# Patient Record
Sex: MALE | Race: WHITE | NOT HISPANIC OR LATINO | Employment: FULL TIME | URBAN - METROPOLITAN AREA
[De-identification: names, ages, dates, MRNs, and addresses within clinical notes are randomized per-mention and may not be internally consistent; named-entity substitution may affect disease eponyms.]

---

## 2017-12-28 ENCOUNTER — OFFICE VISIT - HEALTHEAST (OUTPATIENT)
Dept: INTERNAL MEDICINE | Facility: CLINIC | Age: 32
End: 2017-12-28

## 2017-12-28 DIAGNOSIS — Z80.0 FAMILY HISTORY OF COLON CANCER: ICD-10-CM

## 2017-12-28 DIAGNOSIS — Z00.00 HEALTH CARE MAINTENANCE: ICD-10-CM

## 2017-12-28 DIAGNOSIS — R03.0 ELEVATED BLOOD PRESSURE READING: ICD-10-CM

## 2017-12-28 ASSESSMENT — MIFFLIN-ST. JEOR: SCORE: 2007.66

## 2018-01-17 ENCOUNTER — AMBULATORY - HEALTHEAST (OUTPATIENT)
Dept: LAB | Facility: CLINIC | Age: 33
End: 2018-01-17

## 2018-01-17 ENCOUNTER — COMMUNICATION - HEALTHEAST (OUTPATIENT)
Dept: INTERNAL MEDICINE | Facility: CLINIC | Age: 33
End: 2018-01-17

## 2018-01-17 DIAGNOSIS — Z00.00 HEALTH CARE MAINTENANCE: ICD-10-CM

## 2018-01-17 LAB
ALBUMIN SERPL-MCNC: 3.8 G/DL (ref 3.5–5)
ALP SERPL-CCNC: 23 U/L (ref 45–120)
ALT SERPL W P-5'-P-CCNC: 20 U/L (ref 0–45)
ANION GAP SERPL CALCULATED.3IONS-SCNC: 8 MMOL/L (ref 5–18)
AST SERPL W P-5'-P-CCNC: 20 U/L (ref 0–40)
BILIRUB SERPL-MCNC: 0.7 MG/DL (ref 0–1)
BUN SERPL-MCNC: 17 MG/DL (ref 8–22)
CALCIUM SERPL-MCNC: 9.6 MG/DL (ref 8.5–10.5)
CHLORIDE BLD-SCNC: 100 MMOL/L (ref 98–107)
CHOLEST SERPL-MCNC: 215 MG/DL
CO2 SERPL-SCNC: 28 MMOL/L (ref 22–31)
CREAT SERPL-MCNC: 1.15 MG/DL (ref 0.7–1.3)
ERYTHROCYTE [DISTWIDTH] IN BLOOD BY AUTOMATED COUNT: 12.2 % (ref 11–14.5)
FASTING STATUS PATIENT QL REPORTED: YES
GFR SERPL CREATININE-BSD FRML MDRD: >60 ML/MIN/1.73M2
GLUCOSE BLD-MCNC: 97 MG/DL (ref 70–125)
HCT VFR BLD AUTO: 46.4 % (ref 40–54)
HDLC SERPL-MCNC: 56 MG/DL
HGB BLD-MCNC: 15.3 G/DL (ref 14–18)
LDLC SERPL CALC-MCNC: 138 MG/DL
MCH RBC QN AUTO: 30.5 PG (ref 27–34)
MCHC RBC AUTO-ENTMCNC: 32.9 G/DL (ref 32–36)
MCV RBC AUTO: 93 FL (ref 80–100)
PLATELET # BLD AUTO: 218 THOU/UL (ref 140–440)
PMV BLD AUTO: 7 FL (ref 7–10)
POTASSIUM BLD-SCNC: 4.4 MMOL/L (ref 3.5–5)
PROT SERPL-MCNC: 7.3 G/DL (ref 6–8)
RBC # BLD AUTO: 5 MILL/UL (ref 4.4–6.2)
SODIUM SERPL-SCNC: 136 MMOL/L (ref 136–145)
TRIGL SERPL-MCNC: 103 MG/DL
WBC: 5.2 THOU/UL (ref 4–11)

## 2018-03-19 ENCOUNTER — RECORDS - HEALTHEAST (OUTPATIENT)
Dept: ADMINISTRATIVE | Facility: OTHER | Age: 33
End: 2018-03-19

## 2019-01-05 ENCOUNTER — AMBULATORY - HEALTHEAST (OUTPATIENT)
Dept: NURSING | Facility: CLINIC | Age: 34
End: 2019-01-05

## 2019-07-03 ENCOUNTER — OFFICE VISIT - HEALTHEAST (OUTPATIENT)
Dept: FAMILY MEDICINE | Facility: CLINIC | Age: 34
End: 2019-07-03

## 2019-07-03 DIAGNOSIS — R03.0 ELEVATED BLOOD PRESSURE READING WITHOUT DIAGNOSIS OF HYPERTENSION: ICD-10-CM

## 2019-07-03 DIAGNOSIS — F41.9 ANXIETY: ICD-10-CM

## 2019-07-03 ASSESSMENT — MIFFLIN-ST. JEOR: SCORE: 1993.15

## 2019-07-22 ENCOUNTER — OFFICE VISIT - HEALTHEAST (OUTPATIENT)
Dept: FAMILY MEDICINE | Facility: CLINIC | Age: 34
End: 2019-07-22

## 2019-07-22 DIAGNOSIS — Z00.00 ROUTINE MEDICAL EXAM: ICD-10-CM

## 2019-07-22 ASSESSMENT — MIFFLIN-ST. JEOR: SCORE: 1987.71

## 2019-07-31 ENCOUNTER — COMMUNICATION - HEALTHEAST (OUTPATIENT)
Dept: FAMILY MEDICINE | Facility: CLINIC | Age: 34
End: 2019-07-31

## 2019-07-31 ENCOUNTER — AMBULATORY - HEALTHEAST (OUTPATIENT)
Dept: FAMILY MEDICINE | Facility: CLINIC | Age: 34
End: 2019-07-31

## 2019-07-31 ENCOUNTER — AMBULATORY - HEALTHEAST (OUTPATIENT)
Dept: LAB | Facility: CLINIC | Age: 34
End: 2019-07-31

## 2019-07-31 DIAGNOSIS — Z13.1 SCREENING FOR DIABETES MELLITUS: ICD-10-CM

## 2019-07-31 DIAGNOSIS — Z13.220 SCREENING CHOLESTEROL LEVEL: ICD-10-CM

## 2019-07-31 LAB
CHOLEST SERPL-MCNC: 173 MG/DL
FASTING STATUS PATIENT QL REPORTED: YES
FASTING STATUS PATIENT QL REPORTED: YES
GLUCOSE BLD-MCNC: 98 MG/DL (ref 70–99)
HDLC SERPL-MCNC: 41 MG/DL
LDLC SERPL CALC-MCNC: 117 MG/DL
TRIGL SERPL-MCNC: 74 MG/DL

## 2019-10-02 ENCOUNTER — AMBULATORY - HEALTHEAST (OUTPATIENT)
Dept: NURSING | Facility: CLINIC | Age: 34
End: 2019-10-02

## 2019-12-31 ENCOUNTER — OFFICE VISIT - HEALTHEAST (OUTPATIENT)
Dept: FAMILY MEDICINE | Facility: CLINIC | Age: 34
End: 2019-12-31

## 2019-12-31 DIAGNOSIS — K59.01 SLOW TRANSIT CONSTIPATION: ICD-10-CM

## 2019-12-31 DIAGNOSIS — R10.84 ABDOMINAL PAIN, GENERALIZED: ICD-10-CM

## 2019-12-31 LAB
ALBUMIN SERPL-MCNC: 4.2 G/DL (ref 3.5–5)
ALBUMIN UR-MCNC: ABNORMAL MG/DL
ALP SERPL-CCNC: 24 U/L (ref 45–120)
ALT SERPL W P-5'-P-CCNC: 23 U/L (ref 0–45)
AMORPH CRY #/AREA URNS HPF: ABNORMAL /[HPF]
ANION GAP SERPL CALCULATED.3IONS-SCNC: 8 MMOL/L (ref 5–18)
APPEARANCE UR: CLEAR
AST SERPL W P-5'-P-CCNC: 18 U/L (ref 0–40)
BACTERIA #/AREA URNS HPF: ABNORMAL HPF
BASOPHILS # BLD AUTO: 0 THOU/UL (ref 0–0.2)
BASOPHILS NFR BLD AUTO: 1 % (ref 0–2)
BILIRUB SERPL-MCNC: 0.6 MG/DL (ref 0–1)
BILIRUB UR QL STRIP: ABNORMAL
BUN SERPL-MCNC: 14 MG/DL (ref 8–22)
CALCIUM SERPL-MCNC: 9.9 MG/DL (ref 8.5–10.5)
CHLORIDE BLD-SCNC: 103 MMOL/L (ref 98–107)
CO2 SERPL-SCNC: 28 MMOL/L (ref 22–31)
COLOR UR AUTO: YELLOW
CREAT SERPL-MCNC: 1.23 MG/DL (ref 0.7–1.3)
EOSINOPHIL # BLD AUTO: 0.1 THOU/UL (ref 0–0.4)
EOSINOPHIL NFR BLD AUTO: 3 % (ref 0–6)
ERYTHROCYTE [DISTWIDTH] IN BLOOD BY AUTOMATED COUNT: 12.2 % (ref 11–14.5)
GFR SERPL CREATININE-BSD FRML MDRD: >60 ML/MIN/1.73M2
GLUCOSE BLD-MCNC: 96 MG/DL (ref 70–125)
GLUCOSE UR STRIP-MCNC: NEGATIVE MG/DL
HCT VFR BLD AUTO: 47.8 % (ref 40–54)
HGB BLD-MCNC: 16.3 G/DL (ref 14–18)
HGB UR QL STRIP: NEGATIVE
KETONES UR STRIP-MCNC: ABNORMAL MG/DL
LEUKOCYTE ESTERASE UR QL STRIP: NEGATIVE
LYMPHOCYTES # BLD AUTO: 1.6 THOU/UL (ref 0.8–4.4)
LYMPHOCYTES NFR BLD AUTO: 35 % (ref 20–40)
MCH RBC QN AUTO: 31.4 PG (ref 27–34)
MCHC RBC AUTO-ENTMCNC: 34.2 G/DL (ref 32–36)
MCV RBC AUTO: 92 FL (ref 80–100)
MONOCYTES # BLD AUTO: 0.5 THOU/UL (ref 0–0.9)
MONOCYTES NFR BLD AUTO: 10 % (ref 2–10)
MUCOUS THREADS #/AREA URNS LPF: ABNORMAL LPF
NEUTROPHILS # BLD AUTO: 2.3 THOU/UL (ref 2–7.7)
NEUTROPHILS NFR BLD AUTO: 52 % (ref 50–70)
NITRATE UR QL: NEGATIVE
PH UR STRIP: 5.5 [PH] (ref 5–8)
PLATELET # BLD AUTO: 213 THOU/UL (ref 140–440)
PMV BLD AUTO: 6.9 FL (ref 7–10)
POTASSIUM BLD-SCNC: 4.5 MMOL/L (ref 3.5–5)
PROT SERPL-MCNC: 7.7 G/DL (ref 6–8)
RBC # BLD AUTO: 5.21 MILL/UL (ref 4.4–6.2)
RBC #/AREA URNS AUTO: ABNORMAL HPF
SODIUM SERPL-SCNC: 139 MMOL/L (ref 136–145)
SP GR UR STRIP: >=1.03 (ref 1–1.03)
SQUAMOUS #/AREA URNS AUTO: ABNORMAL LPF
UROBILINOGEN UR STRIP-ACNC: ABNORMAL
WBC #/AREA URNS AUTO: ABNORMAL HPF
WBC: 4.5 THOU/UL (ref 4–11)

## 2019-12-31 ASSESSMENT — MIFFLIN-ST. JEOR: SCORE: 2077.06

## 2020-07-21 ENCOUNTER — RECORDS - HEALTHEAST (OUTPATIENT)
Dept: ADMINISTRATIVE | Facility: OTHER | Age: 35
End: 2020-07-21

## 2020-10-19 ENCOUNTER — OFFICE VISIT - HEALTHEAST (OUTPATIENT)
Dept: FAMILY MEDICINE | Facility: CLINIC | Age: 35
End: 2020-10-19

## 2020-10-19 DIAGNOSIS — R30.0 DYSURIA: ICD-10-CM

## 2020-10-19 DIAGNOSIS — Z00.00 ROUTINE MEDICAL EXAM: ICD-10-CM

## 2020-10-19 LAB
ALBUMIN UR-MCNC: NEGATIVE MG/DL
APPEARANCE UR: CLEAR
BILIRUB UR QL STRIP: NEGATIVE
COLOR UR AUTO: YELLOW
GLUCOSE UR STRIP-MCNC: NEGATIVE MG/DL
HGB UR QL STRIP: NEGATIVE
KETONES UR STRIP-MCNC: NEGATIVE MG/DL
LEUKOCYTE ESTERASE UR QL STRIP: NEGATIVE
NITRATE UR QL: NEGATIVE
PH UR STRIP: 6.5 [PH] (ref 5–8)
SP GR UR STRIP: 1.01 (ref 1–1.03)
UROBILINOGEN UR STRIP-ACNC: NORMAL

## 2020-10-19 ASSESSMENT — ANXIETY QUESTIONNAIRES
3. WORRYING TOO MUCH ABOUT DIFFERENT THINGS: SEVERAL DAYS
7. FEELING AFRAID AS IF SOMETHING AWFUL MIGHT HAPPEN: NOT AT ALL
6. BECOMING EASILY ANNOYED OR IRRITABLE: NOT AT ALL
5. BEING SO RESTLESS THAT IT IS HARD TO SIT STILL: NOT AT ALL
4. TROUBLE RELAXING: NOT AT ALL
2. NOT BEING ABLE TO STOP OR CONTROL WORRYING: NOT AT ALL
1. FEELING NERVOUS, ANXIOUS, OR ON EDGE: SEVERAL DAYS
GAD7 TOTAL SCORE: 2

## 2020-10-19 ASSESSMENT — MIFFLIN-ST. JEOR: SCORE: 2058.13

## 2020-10-19 ASSESSMENT — PATIENT HEALTH QUESTIONNAIRE - PHQ9: SUM OF ALL RESPONSES TO PHQ QUESTIONS 1-9: 2

## 2021-05-27 ASSESSMENT — PATIENT HEALTH QUESTIONNAIRE - PHQ9: SUM OF ALL RESPONSES TO PHQ QUESTIONS 1-9: 2

## 2021-05-28 ASSESSMENT — ANXIETY QUESTIONNAIRES: GAD7 TOTAL SCORE: 2

## 2021-05-30 NOTE — PROGRESS NOTES
MALE PREVENTATIVE EXAM    Assessment and Plan:       1. Routine medical exam    Generally the patient is doing very well.  We discussed healthy lifestyles, including adequate exercise (3-4 times a week for 20-30 minutes), and a healthy diet.  Patient should return for annual physicals, and we can also see them here as needed.           Next follow up:  No follow-ups on file.    Immunization Review  Adult Imm Review: No immunizations due today      I discussed the following with the patient:   Adult Healthy Living: Importance of regular exercise  Healthy nutrition  Getting adequate sleep  Stress management    I have had an Advance Directives discussion with the patient.    Subjective:   Chief Complaint: Sony Phillips is an 33 y.o. male here for a preventative health visit.     HPI: 33-year-old male here today for a full physical and to establish care.  He is doing quite well.  He has no major concerns or problems today.  He is feeling quite well, and tries to eat a good healthy diet and exercise when he is able to.  He would like to get some fasting labs done at that time when it would be convenient and he would be fasting.    Healthy Habits  Are you taking a daily aspirin? No  Do you typically exercising at least 40 min, 3-4 times per week?  Yes  Do you usually eat at least 4 servings of fruit and vegetables a day, include whole grains and fiber and avoid regularly eating high fat foods? Yes  Have you had an eye exam in the past two years? NO  Do you see a dentist twice per year? Yes  Do you have any concerns regarding sleep? No    Safety Screen  If you own firearms, are they secured in a locked gun cabinet or with trigger locks? Yes  Do you feel you are safe where you are living?: Yes (7/22/2019  2:26 PM)  Do you feel you are safe in your relationship(s)?: Yes (7/22/2019  2:26 PM)      Review of Systems:  Please see above.  The rest of the review of systems are negative for all systems.     Cancer Screening   "   Patient has no health maintenance due at this time          Patient Care Team:  Rahul Mckeon MD as PCP - General (Family Medicine)        History     Reviewed By Date/Time Sections Reviewed    Rahul Mckeon MD 7/22/2019  2:33 PM Medical, Surgical, Tobacco, Alcohol, Drug Use, Sexual Activity, Family        Patient is establishing care with me today. Please see past medical history, surgical history, social history and family history, all of which were completed in their entirety today.      Objective:   Vital Signs:   Visit Vitals  /80   Pulse 100   Ht 6' 2\" (1.88 m)   Wt 215 lb 9.6 oz (97.8 kg)   SpO2 100%   BMI 27.68 kg/m           PHYSICAL EXAM    Well developed, well nourished, no acute distress.  HEENT: normocephalic/atraumatic, PERRLA/EOMI, TMs: Gray, normal light reflex, no nasal discharge.  Oral mucosa: no erythema/exudate  Neck: No LAD/masses/thyromegaly/bruits  Lungs: clear bilaterally  Heart: regular rate and rhythm, no murmurs/gallops/rubs.  Abdomen: Normal bowel sounds, soft, non-tender, non-distended, no masses, neg Bunch's/McBurney's, no rebound/guarding  Genital: Bilaterally descended testes, no masses, non-tender, no hernia.  No urethral discharge or erythema.  No lesions, normal phallus.  Lymphatics: no supraclavicular/axillary/epitrochlear/inguinal LAD. No edema.  Neuro: A&O x 3, CN II-XII intact, strength 5/5, reflexes symmetric, sensory intact to light touch.  Psych: Behavior appropriate, engaging.  Thought processes congruent, non-tangential.  Musculoskeletal: no gross deformities.  Skin: no rashes or lesions.            Medication List      as of 7/22/2019 11:59 PM     You have not been prescribed any medications.         Additional Screenings Completed Today:     "

## 2021-05-30 NOTE — PROGRESS NOTES
ASSESSMENT/PLAN:  Anxiety  GAD7=11, PHQ2=0  This is a 33-year-old gentleman who comes today as an ED follow-up for lightheadedness and headaches.  Suspect his symptoms are related to that of anxiety.  Topher 7 score was 11.  We spoke about healthy lifestyle modification including weight finding ramya and using coping mechanisms to help with psychosocial stressors.  We also spoke about psychotherapy and pharmacotherapy.  At this point he will work on some healthy lifestyle modification and internal locus of control.  Motivational interviewing was utilized today    Elevated blood pressure reading without diagnosis of hypertension  Part of his elevation in blood pressure may be related to his anxiety.  See management for anxiety above.  Work on healthy lifestyle modification including that of sodium restriction and daily exercise.  Again motivational interviewing was utilized today.  I recommend that he schedule fasting physical with his primary care provider      CHIEF COMPLAINT:  Chief Complaint   Patient presents with     Hypertension     had an episode of dizziness/lightheadedness Monday went to [facility] ER wants to follow up        HISTORY OF PRESENT ILLNESS:  Sony is a 33 y.o. male presenting to the clinic today for follow up after presenting to [facility]'s ED on 7/1/19. The patient was seen in the ED on 2 days ago after having an episode of lightheadedness, racing heart, and shortness of breath that made it difficult to speak. In the ED he had a normal head and neck CTA. The patient states that the symptoms lasted throughout his duration in the ED, but improved once he was resting at home. He states that he has had episodes of these symptoms in the past, but cannot identify any triggers. He states that he has always been and anxious individual, but this has not interfered with his work or personal life.     REVIEW OF SYSTEMS:   Constitutional: Negative.   HENT: Negative.   Eyes: Negative.   Respiratory: Negative.  "  Cardiovascular: Negative.   Gastrointestinal: Negative.   Endocrine: Negative.   Genitourinary: Negative.   Musculoskeletal: Negative.   Skin: Negative.   Allergic/Immunologic: Negative.   Neurological: Negative.   Hematological: Negative.   Psychiatric/Behavioral: Negative.  All other systems negative.      PFSH:    Expecting first child    TOBACCO USE:  Social History     Tobacco Use   Smoking Status Never Smoker   Smokeless Tobacco Never Used       VITALS:  Vitals:    07/03/19 0859 07/03/19 0933   BP: 122/90 110/74   Pulse: 84    Weight: 216 lb 12.8 oz (98.3 kg)    Height: 6' 2\" (1.88 m)      Wt Readings from Last 3 Encounters:   07/03/19 216 lb 12.8 oz (98.3 kg)   12/28/17 220 lb (99.8 kg)       PHYSICAL EXAM:  Constitutional: Patient is oriented to person, place, and time. Patient appears well-developed and well-nourished. No distress.   Head: Normocephalic and atraumatic.   Right Ear: External ear normal.   Left Ear: External ear normal.   Nose: Nose normal.   Eyes: Conjunctivae and EOM are normal. Right eye exhibits no discharge. Left eye exhibits no discharge. No scleral icterus.   Neurological: Patient is alert and oriented to person, place, and time. No cranial nerve deficit. Coordination normal.   Skin: No rash noted. Patient is not diaphoretic. No erythema. No pallor.    QUALITY MEASURES:  The following high BMI interventions were performed this visit: encouragement to exercise, dietary management education, guidance, and counseling and lifestyle education regarding diet    ADDITIONAL HISTORY SUMMARIZED (2): 7/1/19 ED note reviewed regarding shortness of breath and lightheadedness.   DECISION TO OBTAIN EXTRA INFORMATION (1): None.   RADIOLOGY TESTS (1): Reviewed CT scan report [facility]  LABS (1): Reviewed lab results from 2018.  MEDICINE TESTS (1): None.  INDEPENDENT REVIEW (2 each): None.     Total data points: 4    The visit lasted a total of 25 minutes face to face with the patient. Over " 50% of the time was spent counseling and educating the patient about anxiety.    I, Tierney Souza, am scribing for and in the presence of, Dr. Arreaga.    I, Dr. Arreaga, personally performed the services described in this documentation, as scribed by Tierney Souza in my presence, and it is both accurate and complete.    MEDICATIONS:  No current outpatient medications on file.     No current facility-administered medications for this visit.

## 2021-05-31 VITALS — BODY MASS INDEX: 28.23 KG/M2 | WEIGHT: 220 LBS | HEIGHT: 74 IN

## 2021-06-03 VITALS — BODY MASS INDEX: 27.82 KG/M2 | WEIGHT: 216.8 LBS | HEIGHT: 74 IN

## 2021-06-03 VITALS — BODY MASS INDEX: 27.67 KG/M2 | WEIGHT: 215.6 LBS | HEIGHT: 74 IN

## 2021-06-04 VITALS
HEIGHT: 74 IN | OXYGEN SATURATION: 99 % | WEIGHT: 235.3 LBS | BODY MASS INDEX: 30.2 KG/M2 | HEART RATE: 112 BPM | SYSTOLIC BLOOD PRESSURE: 128 MMHG | DIASTOLIC BLOOD PRESSURE: 92 MMHG

## 2021-06-04 NOTE — PROGRESS NOTES
ASSESSMENT:  1. Slow transit constipation    We did discuss constipation as a general diagnosis today.  No doubt that this is been brewing for some time, and are not 100% sure that all of his symptoms are attributable to this but certainly some of it would be.  We discussed a 2 phase approach using something like milk of magnesia or a colonoscopy prep to clean himself out a bit first, and then daily MiraLAX but I would probably have him plan on taking for about 3 months.  We discussed the importance of good diet and using fiber and plenty of hydration along with the MiraLAX to try to keep things moving along nicely.  I think this will take away some of the cramping that he has been having and discomfort in the left upper quadrant area and then down toward the rectal area.    2. Abdominal pain, generalized    - Urinalysis-UC if Indicated  - HM1(CBC and Differential)  - Comprehensive Metabolic Panel  - HM1 (CBC with Diff)  - XR Abdomen 2 Views          PLAN:  There are no Patient Instructions on file for this visit.    Orders Placed This Encounter   Procedures     XR Abdomen 2 Views     Order Specific Question:   Reason for Exam (Describe Symptoms):     Answer:   Abdominal Pain     Order Specific Question:   Can the procedure be changed per Radiologist protocol?     Answer:   Yes     Urinalysis-UC if Indicated     Comprehensive Metabolic Panel     HM1 (CBC with Diff)     There are no discontinued medications.    No follow-ups on file.    CHIEF COMPLAINT:  Chief Complaint   Patient presents with     GI Problem     Severe stomach pains - 4 instances over past few months -no diarrhea, vomiting or nausea. Had chest pain with the most recent episode that woke him from sleep - happened 12/21/19.       HISTORY OF PRESENT ILLNESS:  Sony is a 34 y.o. male presenting to the clinic today with abdominal pain.  He is noticed several episodes of cramping fairly severe abdominal pain over the last month or so.  He states that one  "time it was after a large meal that he would get a lot of cramping in the epigastric to left upper quadrant area that seemed to resolve over about a half an hour.  It was pretty uncomfortable and severe for him.  He felt like it was a wave of pain that came on and that eventually resolved.  He also described one time in the middle of the night waking up with cramping in the epigastric area and even in the substernal area so that he thought he was actually having chest pain like a heart attack.  It resolved over about 10 or 15 minutes but he was pretty worried about it at that time.  He admits to not having the best diet.  He also admits to not drinking enough fluids during the day.  He has not had troubles with constipation in the past.  He denies any flank pain or radiating pain around to the groin.  Denies any hematuria urgency or frequency of urination.  He does not really complain of constipation directly but also has not had a chance to really investigate his stools very closely.  He has had no fevers or chills or night sweats or weight loss.  No vomiting noted.  No right upper quadrant pain noted.    REVIEW OF SYSTEMS:     All other systems are negative.    PFSH:    Reviewed      TOBACCO USE:  Social History     Tobacco Use   Smoking Status Never Smoker   Smokeless Tobacco Never Used       VITALS:  Vitals:    12/31/19 0858 12/31/19 0958   BP: (!) 134/96 (!) 128/92   Patient Site: Left Arm Left Arm   Patient Position: Sitting Sitting   Cuff Size: Adult Large Adult Large   Pulse: (!) 112    SpO2: 99%    Weight: (!) 235 lb 4.8 oz (106.7 kg)    Height: 6' 2\" (1.88 m)      Wt Readings from Last 3 Encounters:   12/31/19 (!) 235 lb 4.8 oz (106.7 kg)   07/22/19 215 lb 9.6 oz (97.8 kg)   07/03/19 216 lb 12.8 oz (98.3 kg)     Body mass index is 30.21 kg/m .    PHYSICAL EXAM:  Constitutional:  Well appearing patient in no acute distress.  Vitals:  Per nursing notes.    HEENT:  Normocephalic, atraumatic.  Ears are clear " bilaterally, with no fluid or redness, and landmarks visible.  Pupils are equal and reactive to light, extraocular muscles intact, visual fields are full.  Nose is normal, and oropharynx is clear without redness.    Neck is without lymphadenopathy.    Lungs:  Clear to auscultation bilaterally without wheezes, rales or rhonchi.   Cardiac:  Regular rate and rhythm without murmurs, rubs, or gallops.     Abdomen soft nontender nondistended bowel sounds present in all quadrants.    Abdominal x-ray shows a significant amount of stool in the right side as well as the left side and down to the rectal area.    Recent Results (from the past 24 hour(s))   Urinalysis-UC if Indicated   Result Value Ref Range    Color, UA Yellow Colorless, Yellow, Straw, Light Yellow    Clarity, UA Clear Clear    Glucose, UA Negative Negative    Bilirubin, UA Small (!) Negative    Ketones, UA Trace (!) Negative    Specific Gravity, UA >=1.030 1.005 - 1.030    Blood, UA Negative Negative    pH, UA 5.5 5.0 - 8.0    Protein, UA Trace (!) Negative mg/dL    Urobilinogen, UA 0.2 E.U./dL 0.2 E.U./dL, 1.0 E.U./dL    Nitrite, UA Negative Negative    Leukocytes, UA Negative Negative    Bacteria, UA None Seen None Seen hpf    RBC, UA None Seen None Seen, 0-2 hpf    WBC, UA None Seen None Seen, 0-5 hpf    Squam Epithel, UA 0-5 None Seen, 0-5 lpf    Amorphous, UA Few (!) None Seen    Mucus, UA Few (!) None Seen lpf   HM1 (CBC with Diff)   Result Value Ref Range    WBC 4.5 4.0 - 11.0 thou/uL    RBC 5.21 4.40 - 6.20 mill/uL    Hemoglobin 16.3 14.0 - 18.0 g/dL    Hematocrit 47.8 40.0 - 54.0 %    MCV 92 80 - 100 fL    MCH 31.4 27.0 - 34.0 pg    MCHC 34.2 32.0 - 36.0 g/dL    RDW 12.2 11.0 - 14.5 %    Platelets 213 140 - 440 thou/uL    MPV 6.9 (L) 7.0 - 10.0 fL    Neutrophils % 52 50 - 70 %    Lymphocytes % 35 20 - 40 %    Monocytes % 10 2 - 10 %    Eosinophils % 3 0 - 6 %    Basophils % 1 0 - 2 %    Neutrophils Absolute 2.3 2.0 - 7.7 thou/uL    Lymphocytes  Absolute 1.6 0.8 - 4.4 thou/uL    Monocytes Absolute 0.5 0.0 - 0.9 thou/uL    Eosinophils Absolute 0.1 0.0 - 0.4 thou/uL    Basophils Absolute 0.0 0.0 - 0.2 thou/uL      MEDICATIONS:  No current outpatient medications on file.     No current facility-administered medications for this visit.

## 2021-06-05 VITALS
WEIGHT: 232 LBS | OXYGEN SATURATION: 99 % | DIASTOLIC BLOOD PRESSURE: 102 MMHG | SYSTOLIC BLOOD PRESSURE: 148 MMHG | HEIGHT: 74 IN | HEART RATE: 72 BPM | BODY MASS INDEX: 29.77 KG/M2

## 2021-06-12 NOTE — PROGRESS NOTES
MALE PREVENTATIVE EXAM    Assessment and Plan:     Patient has been advised of split billing requirements and indicates understanding: Yes    1. Routine medical exam    Generally the patient is doing very well.  We discussed healthy lifestyles, including adequate exercise (3-4 times a week for 20-30 minutes), and a healthy diet.  Patient should return for annual physicals, and we can also see them here as needed.       2. Dysuria    His urinalysis is unremarkable.  It seems that he is very anxious.  He had a lot of anxiety around his constipation last year now it seems to be concerned that there is something going on here.  He is requesting a referral to urology which we certainly can do for him.  I will put that in and we will give him a call to set that up and follow-up with him afterwards as needed.    - Urinalysis-UC if Indicated  - Ambulatory referral to Urology        Next follow up:  No follow-ups on file.    Immunization Review  Adult Imm Review: Due today, orders placed      I discussed the following with the patient:   Adult Healthy Living: Importance of regular exercise  Healthy nutrition  Getting adequate sleep  Stress management  Use of seat belts        Subjective:   Chief Complaint: Sony Phillips is an 35 y.o. male here for a preventative health visit.    Patient has been advised of split billing requirements and indicates understanding: Yes  HPI: Patient here today for a full physical.  He is generally doing quite well without any major concerns or problems.  He does have one concern about urination.  He states that he has discomfort in the bladder area and sometimes with urination.  He is concerned that there might be something going on there.  He has no hematuria.  He is having some urgency.  He is not really getting up at night very much to go.  It does not seem to be getting any better and has been going on for about a month.    Healthy Habits  Are you taking a daily aspirin? No  Do you  "typically exercising at least 40 min, 3-4 times per week?  Yes  Do you usually eat at least 4 servings of fruit and vegetables a day, include whole grains and fiber and avoid regularly eating high fat foods? Yes  Have you had an eye exam in the past two years? NO  Do you see a dentist twice per year? Yes  Do you have any concerns regarding sleep? No      Review of Systems:  Please see above.  The rest of the review of systems are negative for all systems.     Cancer Screening     Patient has no health maintenance due at this time          Patient Care Team:  Rahul Mckeon MD as PCP - General (Family Medicine)  Rahul Mckeon MD as Assigned PCP        History     Reviewed By Date/Time Sections Reviewed    Rahul Mckeon MD 10/19/2020  4:24 PM Medical, Surgical, Tobacco, Alcohol, Drug Use, Sexual Activity, Jackie Galdamez [facility] 10/19/2020  4:05 PM Tobacco            Objective:   Vital Signs:   Visit Vitals  BP (!) 148/102 (Patient Site: Left Arm, Patient Position: Sitting, Cuff Size: Adult Large)   Pulse 72   Ht 6' 1.75\" (1.873 m)   Wt (!) 232 lb (105.2 kg)   SpO2 99%   BMI 29.99 kg/m           PHYSICAL EXAM    Well developed, well nourished, no acute distress.  HEENT: normocephalic/atraumatic, PERRLA/EOMI, TMs: Gray, normal light reflex, no nasal discharge.  Oral mucosa: no erythema/exudate  Neck: No LAD/masses/thyromegaly/bruits  Lungs: clear bilaterally  Heart: regular rate and rhythm, no murmurs/gallops/rubs.  Abdomen: Normal bowel sounds, soft, non-tender, non-distended, no masses, neg Bunch's/McBurney's, no rebound/guarding  Genital: Bilaterally descended testes, no masses, non-tender, no hernia.  No urethral discharge or erythema.  No lesions, normal phallus.  Lymphatics: no supraclavicular/axillary/epitrochlear/inguinal LAD. No edema.  Neuro: A&O x 3, CN II-XII intact, strength 5/5, reflexes symmetric, sensory intact to light touch.  Psych: Behavior appropriate, engaging.  Thought processes " congruent, non-tangential.  Musculoskeletal: no gross deformities.  Skin: no rashes or lesions.            Medication List      as of October 19, 2020 11:59 PM     You have not been prescribed any medications.         Additional Screenings Completed Today:

## 2021-06-15 NOTE — PROGRESS NOTES
[facility] Follow Up Note    Sony Phillips   32 y.o. male    Date of Visit: 2017    Chief Complaint   Patient presents with     Annual Exam     NOT fasting, colonoscopy referral     Subjective  Snoy is a new patient here for health maintenance physical exam.    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   no kids.  Moved from [place], [place] about a year ago.    He is fairly active and works out, shoveled snow today with good exertional ability.    He has had a past history of borderline elevated blood pressure but has not been on blood pressure medication.  He states his blood pressure often is around 140 over 80s.  He denies family history of elevated blood pressure.  He does appear mildly anxious today.  He has not checked his blood pressure on his own.    He does not use cold medicine, NSAIDs or other medicines.  No history of edema.  No history of kidney issues or illnesses as a child.    He does have a history of tonsillar hypertrophy but has not had tonsillitis for over 8 years, not planning tonsillectomy.  He does snore, but no clear apnea spells and he denies any daytime sleepiness.    No history of palpitations or arrhythmia or SVT or rapid heart rate spells.  No history of syncope.  No chest pain.    He had an uncle at age 33  of colon cancer.  His father has had multiple polyps removed, and has had colonoscopy since his late 30s or early 40s.  Patient had a colonoscopy in  that was negative.  His bowels are normal and no blood in stool or melena.  Patient was told he should have a 5 year follow-up colonoscopy which is due this year.    No family history of other cancers.    No family history of early heart disease except for a grandfather in his 70s.    Patient has never smoked.  No significant alcohol.    No family history of diabetes.    No vision issues.  No headache problems.  No mouth sores or swallowing difficulty.  No  "abdominal pain or heartburn or history of GI bleeding.  Urination is normal and denies any erectile dysfunction.  No rash or history of skin cancer.  No arthritis complaints or previous muscle skeletal injuries.    He does do deer hunting in the fall, he does not know when his last tetanus shot was, he thinks it may have been around 10 years ago.    Diet is fairly good, but does eat some fast food and thinks he could eat a lower salt diet.  Eats regular vegetables.    He denies motorcycle riding or other high risk behavior.    PMHx:  No past medical history on file.  PSHx:  No past surgical history on file.  Immunizations:   There is no immunization history on file for this patient.    ROS A comprehensive review of systems was performed and was otherwise negative    Medications, allergies, and problem list were reviewed and updated    Exam  /82  Pulse (!) 120  Ht 6' 2\" (1.88 m)  Wt 220 lb (99.8 kg)  SpO2 99%  BMI 28.25 kg/m2  Well-developed tall otherwise healthy-appearing male with good muscle tone.  Alert and oriented ×3.  Appears just mildly anxious.  Pupils and irises equal and reactive.  Extraocular muscles intact.  No jaundice or conjunctivitis.  External ears and nose exam is normal and tympanic membranes are normal.  Pharynx is normal, does not look particularly crowded except for significant bilateral tonsillar hypertrophy.  Minimal neck adiposity.  Teeth in good condition.  No other oral lesions.  No active pharyngitis currently.  No cervical or supraclavicular adenopathy.  No JVD.  No thyromegaly or nodularity.  Lungs are clear to auscultation with normal respiratory excursion and spine is normal and chest cavity appears normal.  Heart is regular with no murmur rub or gallop.  +2 pedal pulses bilaterally and no ankle edema.  Feet in good condition.  Abdomen is nonobese nontender no hepatosplenomegaly no pulsatile mass.  Skin exam is normal.  Gait is normal.  Testicles normal bilaterally, no " inguinal hernia.    Blood pressure rechecked by me was 145/85.  Heart rate was in the 80s on my recheck and regular.    Assessment/Plan  1. Health care maintenance  His main issues appear to be family history of early colon cancer as well as an elevated blood pressure today.    He declined a flu shot.    He will check on the tetanus shot.    Additional plan below.  - Comprehensive Metabolic Panel; Future  - HM2(CBC w/o Differential); Future  - Lipid Cascade; Future    2. Family history of colon cancer  Refer for 5 year colonoscopy.  I discussed risk of bowel perforation and other risks with colonoscopy.  He accepts these risks and wishes to proceed.  - Ambulatory referral for Colonoscopy    3. Elevated blood pressure reading  He states he has had elevated blood pressures noticed over the years in the past.  I suspect he may have a borderline elevated blood pressure syndrome.  With the new Sprint trial, goal blood pressure less than 120/80.  I would consider amlodipine for patient if blood pressure remains consistently above 130/80.    He will get his own blood pressure cuff and follow-up within the next 1-2 months to evaluate blood pressure.    Return in about 6 weeks (around 2/8/2018) for Recheck.   Patient Instructions   Schedule a fasting lab appointment within the next couple of weeks.  Results will be mailed to you.    Get a blood pressure cuff and begin checking your blood pressure, at least 10 times before next appointment.    Try to maintain a low-salt diet, avoid fast food, and walk regularly and get regular exercise.    See me in approximately 6-8 weeks for discussion on blood pressure.    Check to see when her last tetanus shot was, determine insurance coverage for tetanus shot in the clinic versus local pharmacy.    [place] [facility] will contact you to set up your colonoscopy.    Avelino Queen MD      No current outpatient prescriptions on file.     No current facility-administered medications  for this visit.      No Known Allergies  Social History   Substance Use Topics     Smoking status: Never Smoker     Smokeless tobacco: Never Used     Alcohol use None

## 2021-06-19 NOTE — LETTER
Letter by Kendall Hernnadez MD at      Author: Kendall Hernandez MD Service: -- Author Type: --    Filed:  Encounter Date: 7/31/2019 Status: (Other)         Sony Phillips  [address] [facility] [address]             July 31, 2019         Dear Mr. Phillips,    Below are the results from your recent visit:    Resulted Orders   Lipid Cascade   Result Value Ref Range    Cholesterol 173 <=199 mg/dL    Triglycerides 74 <=149 mg/dL    HDL Cholesterol 41 >=40 mg/dL    LDL Calculated 117 <=129 mg/dL    Patient Fasting > 8hrs? Yes    Glucose   Result Value Ref Range    Glucose 98 70 - 99 mg/dL    Patient Fasting > 8hrs? Yes     Narrative    Fasting Glucose reference range is 70-99 mg/dL per  American Diabetes Association (ADA) guidelines.       Please call with questions or contact us using [employer].    Sincerely,        Electronically signed by Kendall Nevarez MD

## 2021-06-26 ENCOUNTER — HEALTH MAINTENANCE LETTER (OUTPATIENT)
Age: 36
End: 2021-06-26

## 2021-09-28 ENCOUNTER — IMMUNIZATION (OUTPATIENT)
Dept: FAMILY MEDICINE | Facility: CLINIC | Age: 36
End: 2021-09-28
Payer: COMMERCIAL

## 2021-09-28 PROCEDURE — 90471 IMMUNIZATION ADMIN: CPT

## 2021-09-28 PROCEDURE — 90686 IIV4 VACC NO PRSV 0.5 ML IM: CPT

## 2021-12-11 ENCOUNTER — HEALTH MAINTENANCE LETTER (OUTPATIENT)
Age: 36
End: 2021-12-11

## 2021-12-20 ENCOUNTER — TRANSFERRED RECORDS (OUTPATIENT)
Dept: HEALTH INFORMATION MANAGEMENT | Facility: CLINIC | Age: 36
End: 2021-12-20
Payer: COMMERCIAL

## 2022-09-25 ENCOUNTER — HEALTH MAINTENANCE LETTER (OUTPATIENT)
Age: 37
End: 2022-09-25

## 2022-11-02 ASSESSMENT — ENCOUNTER SYMPTOMS
NERVOUS/ANXIOUS: 0
NAUSEA: 0
DIZZINESS: 0
HEMATURIA: 0
CHILLS: 0
EYE PAIN: 0
HEARTBURN: 0
ABDOMINAL PAIN: 0
SHORTNESS OF BREATH: 0
COUGH: 0
DYSURIA: 0
HEADACHES: 0
CONSTIPATION: 1
MYALGIAS: 0
FREQUENCY: 0
PALPITATIONS: 0
DIARRHEA: 0
HEMATOCHEZIA: 0
ARTHRALGIAS: 0
SORE THROAT: 0
FEVER: 0
JOINT SWELLING: 0
PARESTHESIAS: 0
WEAKNESS: 0

## 2022-11-03 ENCOUNTER — OFFICE VISIT (OUTPATIENT)
Dept: FAMILY MEDICINE | Facility: CLINIC | Age: 37
End: 2022-11-03
Payer: COMMERCIAL

## 2022-11-03 VITALS
SYSTOLIC BLOOD PRESSURE: 126 MMHG | OXYGEN SATURATION: 99 % | HEART RATE: 120 BPM | DIASTOLIC BLOOD PRESSURE: 60 MMHG | BODY MASS INDEX: 25.98 KG/M2 | WEIGHT: 196 LBS | HEIGHT: 73 IN | RESPIRATION RATE: 20 BRPM

## 2022-11-03 DIAGNOSIS — Z23 ENCOUNTER FOR IMMUNIZATION: ICD-10-CM

## 2022-11-03 DIAGNOSIS — Z00.00 ROUTINE PHYSICAL EXAMINATION: Primary | ICD-10-CM

## 2022-11-03 DIAGNOSIS — Z11.59 NEED FOR HEPATITIS C SCREENING TEST: ICD-10-CM

## 2022-11-03 DIAGNOSIS — E78.00 HYPERCHOLESTEREMIA: ICD-10-CM

## 2022-11-03 DIAGNOSIS — Z11.4 SCREENING FOR HIV (HUMAN IMMUNODEFICIENCY VIRUS): ICD-10-CM

## 2022-11-03 DIAGNOSIS — L30.9 DERMATITIS: ICD-10-CM

## 2022-11-03 DIAGNOSIS — Z80.0 FAMILY HISTORY OF COLON CANCER: ICD-10-CM

## 2022-11-03 DIAGNOSIS — E66.3 OVERWEIGHT: ICD-10-CM

## 2022-11-03 PROCEDURE — 90471 IMMUNIZATION ADMIN: CPT | Performed by: FAMILY MEDICINE

## 2022-11-03 PROCEDURE — 99395 PREV VISIT EST AGE 18-39: CPT | Mod: 25 | Performed by: FAMILY MEDICINE

## 2022-11-03 PROCEDURE — 90686 IIV4 VACC NO PRSV 0.5 ML IM: CPT | Performed by: FAMILY MEDICINE

## 2022-11-03 ASSESSMENT — PAIN SCALES - GENERAL: PAINLEVEL: NO PAIN (0)

## 2022-11-03 NOTE — PROGRESS NOTES
Assessment/Plan:     Routine physical examination  Routine healthcare maintenance.  Preventative cares reviewed.  Annual physical exams to continue    Screening for HIV (human immunodeficiency virus)  Routine HIV screen based on age criteria.  - HIV Antigen Antibody Combo    Need for hepatitis C screening test  Routine hepatitis C screen based on age criteria.  - Hepatitis C Screen Reflex to HCV RNA Quant and Genotype    Encounter for immunization  Seasonal flu shot provided.  Patient declines COVID-19 bivalent booster.  States had tetanus shot in 2019  - INFLUENZA VACCINE IM > 6 MONTHS VALENT IIV4 (AFLURIA/FLUZONE)    Overweight  Overweight status.  Has lost 40 pounds since weight max of around 240 pounds with weight goal less than 190 pounds ideally.  - Glucose whole blood    Hypercholesteremia  Hypercholesterolemia historically, mild, diet controlled.  Lipid cascade and fasting glucose to be obtained at earliest convenience  - Lipid panel reflex to direct LDL Fasting  - Glucose whole blood    Dermatitis  Describes forehead dermatitis intermittent in the past unclear etiology.  Dermatology referral was provided per patient request.  - Adult Dermatology Referral    Family history of colon cancer  Paternal uncle  at age 33.   Prior colonoscopy in  to repeat at 5 year interval.     The following high BMI interventions were performed this visit: encouragement to exercise, weight monitoring, weight loss from baseline weight and lifestyle education regarding diet.  Ensure ongoing efforts to achieve weight goal < 195 pounds initially, < 190 pounds ideally.           Subjective:     Sony Phillips is a 37 year old male who presents for an annual exam.  Establishing care.  Had seen Dr. Rahul Mckeon previously.  In general doing well.  Has lost over 40 pounds through dietary and exercise modifications with weight max of 240 pounds described.  Has had mild cholesterol elevation historically.  Elevated  blood pressures as well in office without history of hypertension.  Blood pressures at home with Omron arm cuff 115/70 more recently with benefits of weight loss associated.  Needs flu shot.  Declines COVID bivalent booster.  Family history of colon cancer patient's uncle  age 33.  Patient has received colonoscopies every 5 years and anticipates repeat colonoscopy 2023.  Skin lesions noted intermittently involving forehead and proximal bridge of nose.  Come out of nowhere.  Can be on both sides of forehead.  No ulceration.  No pruritus.  10 to resolve on their own within a couple days.  Comprehensive review of systems as above otherwise all negative.       - Kelly x 2017  2 daughters - ages 3 and 3 months...  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)  Hobbies:  outdoor, fish, hunt, golf, taking care of his kids        Healthy Habits:     Getting at least 3 servings of Calcium per day:  Yes    Bi-annual eye exam:  NO    Dental care twice a year:  Yes    Sleep apnea or symptoms of sleep apnea:  None    Diet:  Regular (no restrictions)    Frequency of exercise:  4-5 days/week    Duration of exercise:  15-30 minutes    Taking medications regularly:  Yes    Medication side effects:  Not applicable    PHQ-2 Total Score: 0    Additional concerns today:  Yes       Immunization History   Administered Date(s) Administered     COVID-19,PFPfizer (12+ Yrs) 2021, 2021, 2021     Influenza Quad, Recombinant, pf(RIV4) (Flublok) 10/19/2020     Influenza Vaccine IM > 6 months Valent IIV4 (Alfuria,Fluzone) 2021     Influenza Vaccine, 6+MO IM (QUADRIVALENT W/PRESERVATIVES) 2019, 10/02/2019     TDAP Vaccine (Adacel) 2019     Immunization status: Seasonal flu shot provided today otherwise  "immunizations up-to-date.  Patient declines COVID-19 bivalent booster.    No current outpatient medications on file.     Past Medical History:   Diagnosis Date     Anxiety      No past surgical history on file.  Patient has no known allergies.  Family History   Problem Relation Age of Onset     Colon Cancer Paternal Uncle      Social History     Socioeconomic History     Marital status:      Spouse name: Not on file     Number of children: Not on file     Years of education: Not on file     Highest education level: Not on file   Occupational History     Not on file   Tobacco Use     Smoking status: Never     Smokeless tobacco: Never   Substance and Sexual Activity     Alcohol use: Yes     Comment: Alcoholic Drinks/day: 1-2/month     Drug use: Never     Sexual activity: Yes     Partners: Female   Other Topics Concern     Not on file   Social History Narrative     Not on file     Social Determinants of Health     Financial Resource Strain: Not on file   Food Insecurity: Not on file   Transportation Needs: Not on file   Physical Activity: Not on file   Stress: Not on file   Social Connections: Not on file   Intimate Partner Violence: Not on file   Housing Stability: Not on file       Review of Systems  Comprehensive ROS: as above, otherwise all negative.           Objective:     /60 (BP Location: Left arm, Patient Position: Sitting, Cuff Size: Adult Large)   Pulse 120   Resp 20   Ht 1.861 m (6' 1.25\")   Wt 88.9 kg (196 lb)   SpO2 99%   BMI 25.68 kg/m    Body mass index is 25.68 kg/m .    Physical    General Appearance:    Alert, cooperative, no distress, appears stated age.  BMI 25.68.   Head:    Normocephalic, without obvious abnormality, atraumatic   Eyes:    PERRL, conjunctiva/corneas clear, EOM's intact, fundi     benign, both eyes        Ears:    Normal TM's and external ear canals, both ears   Nose:   Nares normal, septum midline, mucosa normal, no drainage    or sinus tenderness   Throat:   " Lips, mucosa, and tongue normal; teeth and gums normal   Neck:   Supple, symmetrical, trachea midline, no adenopathy;        thyroid:  No enlargement/tenderness/nodules; no carotid    bruit or JVD   Back:     Symmetric, no curvature, ROM normal, no CVA tenderness   Lungs:     Clear to auscultation bilaterally, respirations unlabored   Chest wall:    No tenderness or deformity   Heart:    Regular rate and rhythm, S1 and S2 normal, no murmur, rub   or gallop   Abdomen:     Soft, non-tender, bowel sounds active all four quadrants,     no masses, no organomegaly.     Genitalia:    Normal male without lesion, discharge or tenderness.  No inguinal hernia noted.     Rectal:    deferred   Extremities:   Extremities normal, atraumatic, no cyanosis or edema   Pulses:   2+ and symmetric all extremities   Skin:   Skin color, texture, turgor normal, no rashes or lesions   Lymph nodes:   Cervical, supraclavicular, and axillary nodes normal   Neurologic:   CNII-XII intact. Normal strength, sensation and reflexes       throughout                This note has been dictated using voice recognition software and as a result may contain minor grammatical errors and unintended word substitutions.             Answers for HPI/ROS submitted by the patient on 11/2/2022  Frequency of exercise:: 4-5 days/week  Getting at least 3 servings of Calcium per day:: Yes  Diet:: Regular (no restrictions)  Taking medications regularly:: Yes  Medication side effects:: Not applicable  Bi-annual eye exam:: NO  Dental care twice a year:: Yes  Sleep apnea or symptoms of sleep apnea:: None  abdominal pain: No  Blood in stool: No  Blood in urine: No  chest pain: No  chills: No  congestion: No  constipation: Yes  cough: No  diarrhea: No  dizziness: No  ear pain: No  eye pain: No  nervous/anxious: No  fever: No  frequency: No  genital sores: No  headaches: No  hearing loss: No  heartburn: No  arthralgias: No  joint swelling: No  peripheral edema: No  mood  changes: No  myalgias: No  nausea: No  dysuria: No  palpitations: No  Skin sensation changes: No  sore throat: No  urgency: No  rash: No  shortness of breath: No  visual disturbance: No  weakness: No  impotence: No  penile discharge: No  Additional concerns today:: Yes  Duration of exercise:: 15-30 minutes

## 2022-11-07 ENCOUNTER — LAB (OUTPATIENT)
Dept: LAB | Facility: CLINIC | Age: 37
End: 2022-11-07
Payer: COMMERCIAL

## 2022-11-07 DIAGNOSIS — Z11.4 SCREENING FOR HIV (HUMAN IMMUNODEFICIENCY VIRUS): ICD-10-CM

## 2022-11-07 DIAGNOSIS — Z11.59 NEED FOR HEPATITIS C SCREENING TEST: ICD-10-CM

## 2022-11-07 DIAGNOSIS — E66.3 OVERWEIGHT: ICD-10-CM

## 2022-11-07 DIAGNOSIS — E78.00 HYPERCHOLESTEREMIA: ICD-10-CM

## 2022-11-07 LAB
CHOLEST SERPL-MCNC: 164 MG/DL
GLUCOSE BLD-MCNC: 60 MG/DL (ref 60–99)
HDLC SERPL-MCNC: 43 MG/DL
LDLC SERPL CALC-MCNC: 105 MG/DL
NONHDLC SERPL-MCNC: 121 MG/DL
TRIGL SERPL-MCNC: 78 MG/DL

## 2022-11-07 PROCEDURE — 86803 HEPATITIS C AB TEST: CPT

## 2022-11-07 PROCEDURE — 36415 COLL VENOUS BLD VENIPUNCTURE: CPT

## 2022-11-07 PROCEDURE — 87389 HIV-1 AG W/HIV-1&-2 AB AG IA: CPT

## 2022-11-07 PROCEDURE — 82947 ASSAY GLUCOSE BLOOD QUANT: CPT

## 2022-11-07 PROCEDURE — 80061 LIPID PANEL: CPT

## 2022-11-08 LAB
HCV AB SERPL QL IA: NONREACTIVE
HIV 1+2 AB+HIV1 P24 AG SERPL QL IA: NONREACTIVE

## 2024-02-14 SDOH — HEALTH STABILITY: PHYSICAL HEALTH: ON AVERAGE, HOW MANY MINUTES DO YOU ENGAGE IN EXERCISE AT THIS LEVEL?: 30 MIN

## 2024-02-14 SDOH — HEALTH STABILITY: PHYSICAL HEALTH: ON AVERAGE, HOW MANY DAYS PER WEEK DO YOU ENGAGE IN MODERATE TO STRENUOUS EXERCISE (LIKE A BRISK WALK)?: 3 DAYS

## 2024-02-14 ASSESSMENT — SOCIAL DETERMINANTS OF HEALTH (SDOH): HOW OFTEN DO YOU GET TOGETHER WITH FRIENDS OR RELATIVES?: ONCE A WEEK

## 2024-05-11 ENCOUNTER — HEALTH MAINTENANCE LETTER (OUTPATIENT)
Age: 39
End: 2024-05-11

## 2024-05-28 SDOH — HEALTH STABILITY: PHYSICAL HEALTH: ON AVERAGE, HOW MANY DAYS PER WEEK DO YOU ENGAGE IN MODERATE TO STRENUOUS EXERCISE (LIKE A BRISK WALK)?: 4 DAYS

## 2024-05-28 SDOH — HEALTH STABILITY: PHYSICAL HEALTH: ON AVERAGE, HOW MANY MINUTES DO YOU ENGAGE IN EXERCISE AT THIS LEVEL?: 30 MIN

## 2024-05-28 ASSESSMENT — SOCIAL DETERMINANTS OF HEALTH (SDOH): HOW OFTEN DO YOU GET TOGETHER WITH FRIENDS OR RELATIVES?: ONCE A WEEK

## 2024-05-31 ENCOUNTER — OFFICE VISIT (OUTPATIENT)
Dept: FAMILY MEDICINE | Facility: CLINIC | Age: 39
End: 2024-05-31
Payer: COMMERCIAL

## 2024-05-31 VITALS
OXYGEN SATURATION: 99 % | TEMPERATURE: 98 F | SYSTOLIC BLOOD PRESSURE: 137 MMHG | RESPIRATION RATE: 16 BRPM | HEART RATE: 80 BPM | BODY MASS INDEX: 28.03 KG/M2 | WEIGHT: 218.4 LBS | DIASTOLIC BLOOD PRESSURE: 94 MMHG | HEIGHT: 74 IN

## 2024-05-31 DIAGNOSIS — Z80.0 FAMILY HISTORY OF COLON CANCER: ICD-10-CM

## 2024-05-31 DIAGNOSIS — Z00.00 ANNUAL PHYSICAL EXAM: Primary | ICD-10-CM

## 2024-05-31 DIAGNOSIS — E78.5 HYPERLIPIDEMIA LDL GOAL <130: ICD-10-CM

## 2024-05-31 DIAGNOSIS — F41.0 PANIC ATTACK: ICD-10-CM

## 2024-05-31 DIAGNOSIS — F41.1 GAD (GENERALIZED ANXIETY DISORDER): ICD-10-CM

## 2024-05-31 LAB
BASOPHILS # BLD AUTO: 0 10E3/UL (ref 0–0.2)
BASOPHILS NFR BLD AUTO: 0 %
EOSINOPHIL # BLD AUTO: 0 10E3/UL (ref 0–0.7)
EOSINOPHIL NFR BLD AUTO: 0 %
ERYTHROCYTE [DISTWIDTH] IN BLOOD BY AUTOMATED COUNT: 12.9 % (ref 10–15)
HBA1C MFR BLD: 5.6 % (ref 0–5.6)
HCT VFR BLD AUTO: 46.4 % (ref 40–53)
HGB BLD-MCNC: 15.6 G/DL (ref 13.3–17.7)
IMM GRANULOCYTES # BLD: 0 10E3/UL
IMM GRANULOCYTES NFR BLD: 0 %
LYMPHOCYTES # BLD AUTO: 1.7 10E3/UL (ref 0.8–5.3)
LYMPHOCYTES NFR BLD AUTO: 36 %
MCH RBC QN AUTO: 30.4 PG (ref 26.5–33)
MCHC RBC AUTO-ENTMCNC: 33.6 G/DL (ref 31.5–36.5)
MCV RBC AUTO: 90 FL (ref 78–100)
MONOCYTES # BLD AUTO: 0.4 10E3/UL (ref 0–1.3)
MONOCYTES NFR BLD AUTO: 9 %
NEUTROPHILS # BLD AUTO: 2.6 10E3/UL (ref 1.6–8.3)
NEUTROPHILS NFR BLD AUTO: 55 %
PLATELET # BLD AUTO: 269 10E3/UL (ref 150–450)
RBC # BLD AUTO: 5.13 10E6/UL (ref 4.4–5.9)
WBC # BLD AUTO: 4.7 10E3/UL (ref 4–11)

## 2024-05-31 PROCEDURE — 84403 ASSAY OF TOTAL TESTOSTERONE: CPT | Performed by: FAMILY MEDICINE

## 2024-05-31 PROCEDURE — 84270 ASSAY OF SEX HORMONE GLOBUL: CPT | Performed by: FAMILY MEDICINE

## 2024-05-31 PROCEDURE — 82533 TOTAL CORTISOL: CPT | Performed by: FAMILY MEDICINE

## 2024-05-31 PROCEDURE — 80053 COMPREHEN METABOLIC PANEL: CPT | Performed by: FAMILY MEDICINE

## 2024-05-31 PROCEDURE — 83540 ASSAY OF IRON: CPT | Performed by: FAMILY MEDICINE

## 2024-05-31 PROCEDURE — 99213 OFFICE O/P EST LOW 20 MIN: CPT | Mod: 25 | Performed by: FAMILY MEDICINE

## 2024-05-31 PROCEDURE — 83550 IRON BINDING TEST: CPT | Performed by: FAMILY MEDICINE

## 2024-05-31 PROCEDURE — 83036 HEMOGLOBIN GLYCOSYLATED A1C: CPT | Performed by: FAMILY MEDICINE

## 2024-05-31 PROCEDURE — 85025 COMPLETE CBC W/AUTO DIFF WBC: CPT | Performed by: FAMILY MEDICINE

## 2024-05-31 PROCEDURE — 84443 ASSAY THYROID STIM HORMONE: CPT | Performed by: FAMILY MEDICINE

## 2024-05-31 PROCEDURE — 80061 LIPID PANEL: CPT | Performed by: FAMILY MEDICINE

## 2024-05-31 PROCEDURE — 36415 COLL VENOUS BLD VENIPUNCTURE: CPT | Performed by: FAMILY MEDICINE

## 2024-05-31 PROCEDURE — 99395 PREV VISIT EST AGE 18-39: CPT | Performed by: FAMILY MEDICINE

## 2024-05-31 RX ORDER — ESCITALOPRAM OXALATE 10 MG/1
10 TABLET ORAL DAILY
COMMUNITY
End: 2024-05-31

## 2024-05-31 RX ORDER — MIRTAZAPINE 7.5 MG/1
1 TABLET, FILM COATED ORAL AT BEDTIME
COMMUNITY
Start: 2023-04-18 | End: 2024-05-31

## 2024-05-31 RX ORDER — HYDROXYZINE HYDROCHLORIDE 25 MG/1
25 TABLET, FILM COATED ORAL
COMMUNITY
Start: 2023-03-06 | End: 2024-05-31

## 2024-05-31 RX ORDER — LORAZEPAM 0.5 MG/1
1 TABLET ORAL AT BEDTIME
COMMUNITY
Start: 2023-04-18 | End: 2024-05-31

## 2024-05-31 ASSESSMENT — PAIN SCALES - GENERAL: PAINLEVEL: EXTREME PAIN (9)

## 2024-05-31 ASSESSMENT — ANXIETY QUESTIONNAIRES
6. BECOMING EASILY ANNOYED OR IRRITABLE: MORE THAN HALF THE DAYS
3. WORRYING TOO MUCH ABOUT DIFFERENT THINGS: NEARLY EVERY DAY
IF YOU CHECKED OFF ANY PROBLEMS ON THIS QUESTIONNAIRE, HOW DIFFICULT HAVE THESE PROBLEMS MADE IT FOR YOU TO DO YOUR WORK, TAKE CARE OF THINGS AT HOME, OR GET ALONG WITH OTHER PEOPLE: SOMEWHAT DIFFICULT
7. FEELING AFRAID AS IF SOMETHING AWFUL MIGHT HAPPEN: SEVERAL DAYS
GAD7 TOTAL SCORE: 14
1. FEELING NERVOUS, ANXIOUS, OR ON EDGE: NEARLY EVERY DAY
5. BEING SO RESTLESS THAT IT IS HARD TO SIT STILL: SEVERAL DAYS
GAD7 TOTAL SCORE: 14
2. NOT BEING ABLE TO STOP OR CONTROL WORRYING: MORE THAN HALF THE DAYS

## 2024-05-31 ASSESSMENT — PATIENT HEALTH QUESTIONNAIRE - PHQ9: 5. POOR APPETITE OR OVEREATING: MORE THAN HALF THE DAYS

## 2024-05-31 NOTE — PROGRESS NOTES
"Preventive Care Visit  [facility] MIGUEL ANGEL Centeno MD, Family Medicine  May 31, 2024      Assessment & Plan     Annual physical exam  Advised healthy lifestyle. Check labs and notify with results.  - CBC with Platelets & Differential; Future  - Comprehensive metabolic panel; Future  - Lipid Profile; Future  - TSH with free T4 reflex; Future  - Hemoglobin A1c; Future  - Cortisol; Future  - Testosterone Free and Total; Future  - Iron and iron binding capacity; Future    YADY (generalized anxiety disorder)  Patient failed Lexapro, hydroxyzine, mirtazapine.  Patient is still having significant symptoms from anxiety.  Referral to psychiatrist for evaluation and treatment.  - Adult Mental Health  Referral; Future    Panic attack  Patient failed Lexapro, hydroxyzine, mirtazapine.  Patient is still having significant symptoms from anxiety.  Referral to psychiatrist for evaluation and treatment.  - Adult Mental Health  Referral; Future    Family history of colon cancer  Up-to-date with colon cancer screening with colonoscopy done in January 2024.    Hyperlipidemia LDL goal <130  Advised healthy lifestyle.  Check labs and notify with results.                BMI  Estimated body mass index is 27.89 kg/m  as calculated from the following:    Height as of this encounter: 1.885 m (6' 2.2\").    Weight as of this encounter: 99.1 kg (218 lb 6.4 oz).   Weight management plan: Discussed healthy diet and exercise guidelines    Counseling  Appropriate preventive services were discussed with this patient, including applicable screening as appropriate for fall prevention, nutrition, physical activity, Tobacco-use cessation, weight loss and cognition.  Checklist reviewing preventive services available has been given to the patient.  Reviewed patient's diet, addressing concerns and/or questions.           Dario Hutchinson is a 38 year old, presenting for the following:  Establish Care, Physical " "(Fasting, would like labs), Dizziness, and Anxiety        5/31/2024    10:06 AM   Additional Questions   Roomed by [facility], Visit Facilitator        Health Care Directive  Patient does not have a Health Care Directive or Living Will: Discussed advance care planning with patient; information given to patient to review.    HPI    Exercise: daily brisk walk at lunch for 30 minutes. Garage gym but not using regularly.    YADY: He gets significant dizziness when he gets episodes of high anxiety. He gets IBS symptoms from anxiety, where he feels urgency to have a bowel movement. Admits to have some health anxiety. He has been prescribed Lexapro 5-10 mg daily, took it for 4-5 months, but he felt \"dull\" and \"emotionless\" with the Lexapro and did not feel that much better. He has tried therapy in the past.              5/28/2024   General Health   How would you rate your overall physical health? Good   Feel stress (tense, anxious, or unable to sleep) Very much   (!) STRESS CONCERN      5/28/2024   Nutrition   Three or more servings of calcium each day? (!) NO   Diet: Regular (no restrictions)   How many servings of fruit and vegetables per day? (!) 0-1   How many sweetened beverages each day? 0-1         5/28/2024   Exercise   Days per week of moderate/strenous exercise 4 days   Average minutes spent exercising at this level 30 min         5/28/2024   Social Factors   Frequency of gathering with friends or relatives Once a week   Worry food won't last until get money to buy more No   Food not last or not have enough money for food? No   Do you have housing?  Yes   Are you worried about losing your housing? No   Lack of transportation? No   Unable to get utilities (heat,electricity)? No         5/28/2024   Dental   Dentist two times every year? Yes         5/28/2024   TB Screening   Were you born outside of the [place]? No         Today's PHQ-2 Score:       2/14/2024     3:27 PM   PHQ-2 ( 1999 Pfizer)   Q1: Little interest or " "pleasure in doing things 0   Q2: Feeling down, depressed or hopeless 0   PHQ-2 Score 0    0    0   Q1: Little interest or pleasure in doing things Not at all   Q2: Feeling down, depressed or hopeless Not at all   PHQ-2 Score 0           5/28/2024   Substance Use   Alcohol more than 3/day or more than 7/wk No   Do you use any other substances recreationally? No     Social History     Tobacco Use    Smoking status: Never     Passive exposure: Never    Smokeless tobacco: Never   Vaping Use    Vaping status: Never Used   Substance Use Topics    Alcohol use: Yes     Comment: Alcoholic Drinks/day: 1-2/month    Drug use: Never           5/28/2024   STI Screening   New sexual partner(s) since last STI/HIV test? No         5/28/2024   Contraception/Family Planning   Questions about contraception or family planning (!) DECLINE        Reviewed and updated as needed this visit by Provider                          Review of Systems  Constitutional, HEENT, cardiovascular, pulmonary, gi and gu systems are negative, except as otherwise noted.     Objective    Exam  BP (!) 138/93 (BP Location: Left arm, Patient Position: Sitting, Cuff Size: Adult Large)   Pulse 80   Temp 98  F (36.7  C) (Oral)   Resp 16   Ht 1.885 m (6' 2.2\")   Wt 99.1 kg (218 lb 6.4 oz)   SpO2 99%   BMI 27.89 kg/m     Estimated body mass index is 27.89 kg/m  as calculated from the following:    Height as of this encounter: 1.885 m (6' 2.2\").    Weight as of this encounter: 99.1 kg (218 lb 6.4 oz).    Physical Exam  GENERAL: alert and no distress  EYES: Eyes grossly normal to inspection, PERRL and conjunctivae and sclerae normal  HENT: ear canals and TM's normal, nose and mouth without ulcers or lesions  NECK: no adenopathy, no asymmetry, masses, or scars  RESP: lungs clear to auscultation - no rales, rhonchi or wheezes  CV: regular rate and rhythm, normal S1 S2, no S3 or S4, no murmur, click or rub, no peripheral edema  ABDOMEN: soft, nontender, no " hepatosplenomegaly, no masses and bowel sounds normal  MS: no gross musculoskeletal defects noted, no edema  SKIN: no suspicious lesions or rashes  NEURO: Normal strength and tone, mentation intact and speech normal  PSYCH: mentation appears normal, affect normal/bright        Signed Electronically by: Donald Centeno MD

## 2024-06-01 LAB
ALBUMIN SERPL BCG-MCNC: 4.7 G/DL (ref 3.5–5.2)
ALP SERPL-CCNC: 20 U/L (ref 40–150)
ALT SERPL W P-5'-P-CCNC: 34 U/L (ref 0–70)
ANION GAP SERPL CALCULATED.3IONS-SCNC: 9 MMOL/L (ref 7–15)
AST SERPL W P-5'-P-CCNC: 20 U/L (ref 0–45)
BILIRUB SERPL-MCNC: 0.6 MG/DL
BUN SERPL-MCNC: 13.3 MG/DL (ref 6–20)
CALCIUM SERPL-MCNC: 9.8 MG/DL (ref 8.6–10)
CHLORIDE SERPL-SCNC: 102 MMOL/L (ref 98–107)
CHOLEST SERPL-MCNC: 199 MG/DL
CORTIS SERPL-MCNC: 11.8 UG/DL
CREAT SERPL-MCNC: 1.14 MG/DL (ref 0.67–1.17)
DEPRECATED HCO3 PLAS-SCNC: 27 MMOL/L (ref 22–29)
EGFRCR SERPLBLD CKD-EPI 2021: 85 ML/MIN/1.73M2
FASTING STATUS PATIENT QL REPORTED: YES
FASTING STATUS PATIENT QL REPORTED: YES
GLUCOSE SERPL-MCNC: 112 MG/DL (ref 70–99)
HDLC SERPL-MCNC: 41 MG/DL
IRON BINDING CAPACITY (ROCHE): 277 UG/DL (ref 240–430)
IRON SATN MFR SERPL: 36 % (ref 15–46)
IRON SERPL-MCNC: 99 UG/DL (ref 61–157)
LDLC SERPL CALC-MCNC: 138 MG/DL
NONHDLC SERPL-MCNC: 158 MG/DL
POTASSIUM SERPL-SCNC: 4.5 MMOL/L (ref 3.4–5.3)
PROT SERPL-MCNC: 7.8 G/DL (ref 6.4–8.3)
SHBG SERPL-SCNC: 51 NMOL/L (ref 11–80)
SODIUM SERPL-SCNC: 138 MMOL/L (ref 135–145)
TRIGL SERPL-MCNC: 100 MG/DL
TSH SERPL DL<=0.005 MIU/L-ACNC: 0.6 UIU/ML (ref 0.3–4.2)

## 2024-06-05 LAB
TESTOST FREE SERPL-MCNC: 11.25 NG/DL
TESTOST SERPL-MCNC: 676 NG/DL (ref 240–950)

## 2024-06-06 ENCOUNTER — MYC MEDICAL ADVICE (OUTPATIENT)
Dept: FAMILY MEDICINE | Facility: CLINIC | Age: 39
End: 2024-06-06
Payer: COMMERCIAL

## 2024-06-06 DIAGNOSIS — I10 ESSENTIAL HYPERTENSION: Primary | ICD-10-CM

## 2024-06-10 RX ORDER — PROPRANOLOL HCL 60 MG
60 CAPSULE, EXTENDED RELEASE 24HR ORAL DAILY
Qty: 30 CAPSULE | Refills: 1 | Status: SHIPPED | OUTPATIENT
Start: 2024-06-10

## 2024-12-13 ENCOUNTER — TRANSFERRED RECORDS (OUTPATIENT)
Dept: HEALTH INFORMATION MANAGEMENT | Facility: CLINIC | Age: 39
End: 2024-12-13
Payer: COMMERCIAL

## 2025-05-08 ENCOUNTER — PATIENT OUTREACH (OUTPATIENT)
Dept: CARE COORDINATION | Facility: CLINIC | Age: 40
End: 2025-05-08
Payer: COMMERCIAL

## 2025-07-19 ENCOUNTER — HEALTH MAINTENANCE LETTER (OUTPATIENT)
Age: 40
End: 2025-07-19